# Patient Record
Sex: MALE | Race: NATIVE HAWAIIAN OR OTHER PACIFIC ISLANDER | NOT HISPANIC OR LATINO | Employment: FULL TIME | URBAN - METROPOLITAN AREA
[De-identification: names, ages, dates, MRNs, and addresses within clinical notes are randomized per-mention and may not be internally consistent; named-entity substitution may affect disease eponyms.]

---

## 2017-04-20 ENCOUNTER — NON-PROVIDER VISIT (OUTPATIENT)
Dept: URGENT CARE | Facility: PHYSICIAN GROUP | Age: 37
End: 2017-04-20

## 2017-04-20 DIAGNOSIS — Z02.1 PRE-EMPLOYMENT DRUG SCREENING: ICD-10-CM

## 2017-04-20 LAB
AMP AMPHETAMINE: NORMAL
COC COCAINE: NORMAL
INT CON NEG: NEGATIVE
INT CON POS: POSITIVE
MET METHAMPHETAMINES: NORMAL
OPI OPIATES: NORMAL
PCP PHENCYCLIDINE: NORMAL
POC DRUG COMMENT 753798-OCCUPATIONAL HEALTH: NORMAL
THC: NORMAL

## 2017-04-20 PROCEDURE — 80305 DRUG TEST PRSMV DIR OPT OBS: CPT | Performed by: EMERGENCY MEDICINE

## 2017-04-20 NOTE — PROGRESS NOTES
Kannan Farnsworth is a 37 y.o. male here for a non-provider visit for UDS    If abnormal was an in office provider notified today (if so, indicate provider)? No  Routed to PCP? No

## 2019-03-20 ENCOUNTER — APPOINTMENT (OUTPATIENT)
Dept: RADIOLOGY | Facility: MEDICAL CENTER | Age: 39
End: 2019-03-20
Attending: ORTHOPAEDIC SURGERY
Payer: COMMERCIAL

## 2019-04-08 ENCOUNTER — ANESTHESIA EVENT (OUTPATIENT)
Dept: RADIOLOGY | Facility: MEDICAL CENTER | Age: 39
End: 2019-04-08
Payer: COMMERCIAL

## 2019-04-08 ENCOUNTER — ANESTHESIA (OUTPATIENT)
Dept: RADIOLOGY | Facility: MEDICAL CENTER | Age: 39
End: 2019-04-08
Payer: COMMERCIAL

## 2019-04-08 ENCOUNTER — HOSPITAL ENCOUNTER (OUTPATIENT)
Dept: RADIOLOGY | Facility: MEDICAL CENTER | Age: 39
End: 2019-04-08
Attending: ORTHOPAEDIC SURGERY
Payer: COMMERCIAL

## 2019-04-08 VITALS
TEMPERATURE: 98.3 F | HEIGHT: 68 IN | RESPIRATION RATE: 18 BRPM | HEART RATE: 90 BPM | OXYGEN SATURATION: 97 % | BODY MASS INDEX: 25.49 KG/M2 | WEIGHT: 168.21 LBS

## 2019-04-08 DIAGNOSIS — M25.511 RIGHT SHOULDER PAIN, UNSPECIFIED CHRONICITY: ICD-10-CM

## 2019-04-08 DIAGNOSIS — Z78.9 ELECTRONIC CIGARETTE USE: ICD-10-CM

## 2019-04-08 DIAGNOSIS — M25.531 RIGHT WRIST PAIN: ICD-10-CM

## 2019-04-08 PROCEDURE — 700105 HCHG RX REV CODE 258: Performed by: ANESTHESIOLOGY

## 2019-04-08 PROCEDURE — 700111 HCHG RX REV CODE 636 W/ 250 OVERRIDE (IP): Performed by: ANESTHESIOLOGY

## 2019-04-08 PROCEDURE — 700111 HCHG RX REV CODE 636 W/ 250 OVERRIDE (IP)

## 2019-04-08 PROCEDURE — 99153 MOD SED SAME PHYS/QHP EA: CPT

## 2019-04-08 PROCEDURE — 700101 HCHG RX REV CODE 250: Performed by: ANESTHESIOLOGY

## 2019-04-08 PROCEDURE — 73221 MRI JOINT UPR EXTREM W/O DYE: CPT | Mod: RT

## 2019-04-08 PROCEDURE — 700101 HCHG RX REV CODE 250

## 2019-04-08 RX ORDER — MIDAZOLAM HYDROCHLORIDE 5 MG/ML
INJECTION INTRAMUSCULAR; INTRAVENOUS
Status: DISPENSED
Start: 2019-04-08 | End: 2019-04-08

## 2019-04-08 RX ORDER — SODIUM CHLORIDE, SODIUM LACTATE, POTASSIUM CHLORIDE, CALCIUM CHLORIDE 600; 310; 30; 20 MG/100ML; MG/100ML; MG/100ML; MG/100ML
INJECTION, SOLUTION INTRAVENOUS
Status: DISCONTINUED | OUTPATIENT
Start: 2019-04-08 | End: 2019-04-08 | Stop reason: SURG

## 2019-04-08 RX ORDER — DEXMEDETOMIDINE HYDROCHLORIDE 100 UG/ML
INJECTION, SOLUTION INTRAVENOUS PRN
Status: DISCONTINUED | OUTPATIENT
Start: 2019-04-08 | End: 2019-04-08 | Stop reason: SURG

## 2019-04-08 RX ORDER — ONDANSETRON 2 MG/ML
4 INJECTION INTRAMUSCULAR; INTRAVENOUS
Status: DISCONTINUED | OUTPATIENT
Start: 2019-04-08 | End: 2019-04-09 | Stop reason: HOSPADM

## 2019-04-08 RX ORDER — DIPHENHYDRAMINE HYDROCHLORIDE 50 MG/ML
INJECTION INTRAMUSCULAR; INTRAVENOUS
Status: DISPENSED
Start: 2019-04-08 | End: 2019-04-08

## 2019-04-08 RX ORDER — SODIUM CHLORIDE, SODIUM LACTATE, POTASSIUM CHLORIDE, AND CALCIUM CHLORIDE .6; .31; .03; .02 G/100ML; G/100ML; G/100ML; G/100ML
500 INJECTION, SOLUTION INTRAVENOUS ONCE
Status: DISCONTINUED | OUTPATIENT
Start: 2019-04-08 | End: 2019-04-09 | Stop reason: HOSPADM

## 2019-04-08 RX ORDER — DEXMEDETOMIDINE HYDROCHLORIDE 100 UG/ML
INJECTION, SOLUTION INTRAVENOUS
Status: COMPLETED
Start: 2019-04-08 | End: 2019-04-08

## 2019-04-08 RX ADMIN — SODIUM CHLORIDE, POTASSIUM CHLORIDE, SODIUM LACTATE AND CALCIUM CHLORIDE: 600; 310; 30; 20 INJECTION, SOLUTION INTRAVENOUS at 07:30

## 2019-04-08 RX ADMIN — GLYCOPYRROLATE 0.2 MG: 0.2 INJECTION INTRAMUSCULAR; INTRAVENOUS at 07:35

## 2019-04-08 RX ADMIN — MIDAZOLAM 5 MG: 1 INJECTION INTRAMUSCULAR; INTRAVENOUS at 07:35

## 2019-04-08 RX ADMIN — DEXMEDETOMIDINE HYDROCHLORIDE 20 MCG: 100 INJECTION, SOLUTION INTRAVENOUS at 07:35

## 2019-04-08 ASSESSMENT — PAIN SCALES - GENERAL: PAIN_LEVEL: 1

## 2019-04-08 NOTE — ANESTHESIA PREPROCEDURE EVALUATION
40yo M w/ shoulder and wrist pain.    No Known Allergies     Relevant Problems   Within Normal Limits   CARDIAC   ENDO   GI      NEURO   PULMONARY      Pertinent Negatives   (-) History of anesthesia complications   (-) Sleep apnea     History reviewed. No pertinent past medical history.     Current Outpatient Prescriptions on File Prior to Encounter   Medication Sig Dispense Refill   • naproxen (NAPROSYN) 500 MG TABS Take 1 Tab by mouth 2 times a day, with meals. 15 Each 2   • naproxen (NAPROSYN) 500 MG TABS Take 1 Tab by mouth every 12 hours. 20 Each 0   • ibuprofen (MOTRIN) 600 MG TABS Take 1 Tab by mouth 3 times a day, with meals. As needed for pain 20 Each 0     No current facility-administered medications on file prior to encounter.       History reviewed. No pertinent surgical history.     Physical Exam    Airway   Mallampati: II  TM distance: <3 FB  Neck ROM: full       Cardiovascular   Rhythm: regular  Rate: normal  (-) murmur     Dental - normal exam         Pulmonary   Breath sounds clear to auscultation     Abdominal    Neurological - normal exam               Anesthesia Plan    ASA 2       Plan - MAC             Induction: intravenous      Pertinent diagnostic labs and testing reviewed    Informed Consent:    Anesthetic plan and risks discussed with patient.

## 2019-04-08 NOTE — ANESTHESIA POSTPROCEDURE EVALUATION
Patient: Kannan Farnsworth    Procedure Summary     Date:  04/08/19 Room / Location:  [facility] [address]    Anesthesia Start:  0730 Anesthesia Stop:  0849    Procedure:  MR-SHOULDER-W/O Diagnosis:  Right shoulder pain, unspecified chronicity    Scheduled Providers:   Responsible Provider:  Livier Haskins M.D.    Anesthesia Type:  MAC ASA Status:  2          Final Anesthesia Type: MAC  Last vitals  BP     103/68   Temp     98.3F   Pulse    85   Resp     16   SpO2     98     Anesthesia Post Evaluation    Patient location during evaluation: bedside  Patient participation: complete - patient participated  Level of consciousness: awake and alert  Pain score: 1    Airway patency: patent  Anesthetic complications: no  Cardiovascular status: adequate and hemodynamically stable  Respiratory status: acceptable, room air and nonlabored ventilation  Hydration status: euvolemic    PONV: none

## 2019-04-08 NOTE — ANESTHESIA TIME REPORT
Anesthesia Start and Stop Event Times     Date Time Event    4/8/2019 0730 Anesthesia Start     0849 Anesthesia Stop        Responsible Staff  04/08/19    Name Role Begin End    Livier Haskins M.D. Anesth 0730 0849        Preop Diagnosis (Free Text):  Pre-op Diagnosis             Preop Diagnosis (Codes):    Post op Diagnosis  Shoulder pain      Premium Reason  Non-Premium    Comments:

## 2019-04-08 NOTE — DISCHARGE INSTRUCTIONS
MRI ADULT DISCHARGE INSTRUCTIONS    You have been medicated today for your scan. Please follow the instructions below to ensure your safe recovery. If you have any questions or problems, feel free to call us at [phone] or [phone].     1.   Have someone stay with you to assist you as needed.    2.   Do not drive or operate any mechanical devices.    3.   Do not perform any activity that requires concentration. Make no major decisions over the next 24 hours.     4.   Be careful changing positions from laying down to sitting or standing, as you may become dizzy.     5.   Do not drink alcohol for 48 hours.    6.   There are no restrictions for eating your normal meals. Drink fluids.    7.   You may continue your usual medications for pain, tranquilizers, muscle relaxants or sedatives when awake.     8.   Tomorrow, you may continue your normal daily activities.     9.   Pressure dressing on 10 - 15 minutes. If swelling or bleeding occurs when removed, continue placing direct pressure on injection site for another 5 minutes, or until bleeding stops.     Midazolam (VERSED)  What is this medicine?  You were given MIDAZOLAM (ALEXUS lynn) for your procedure today. This medication is a benzodiazepine. It is used to cause relaxation or sleep before surgery and to block the memory of the procedure.  This medicine may be used for other purposes; ask your health care provider or pharmacist if you have questions.  What side effects may I notice from receiving this medicine?  Side effects that you should report to your doctor or health care professional as soon as possible:  • allergic reactions like skin rash, itching or hives, swelling of the face, lips, or tongue  • breathing problems  • confusion  • dizziness or lightheadedness  • fast, irregular heartbeat  • halluninations during recovery  • numbness or tingling in the hands or feet  • pain, redness, or swelling at site where injected  • seizures  Side effects that usually  do not require medical attention (report to your doctor or health care professional if they continue or are bothersome):  • coughing  • headache  • hiccups  • involuntary eye and muscle movements  • loss of memory of events just before, during, and after use  • nausea, vomiting  • speech problems  • tiredness  • trouble sleeping or nightmares  This list may not describe all possible side effects. Call your doctor for medical advice about side effects. You may report side effects to FDA at 5-960-MVK-6329.          I have been informed of and understand the above discharge instructions.

## 2019-08-26 ENCOUNTER — APPOINTMENT (OUTPATIENT)
Dept: PHYSICAL THERAPY | Facility: REHABILITATION | Age: 39
End: 2019-08-26
Payer: COMMERCIAL

## 2019-08-30 ENCOUNTER — APPOINTMENT (OUTPATIENT)
Dept: PHYSICAL THERAPY | Facility: REHABILITATION | Age: 39
End: 2019-08-30
Payer: COMMERCIAL

## 2019-09-06 ENCOUNTER — APPOINTMENT (OUTPATIENT)
Dept: PHYSICAL THERAPY | Facility: REHABILITATION | Age: 39
End: 2019-09-06
Payer: COMMERCIAL

## 2019-09-09 ENCOUNTER — APPOINTMENT (OUTPATIENT)
Dept: PHYSICAL THERAPY | Facility: REHABILITATION | Age: 39
End: 2019-09-09
Payer: COMMERCIAL

## 2019-09-13 ENCOUNTER — APPOINTMENT (OUTPATIENT)
Dept: PHYSICAL THERAPY | Facility: REHABILITATION | Age: 39
End: 2019-09-13
Payer: COMMERCIAL

## 2019-09-16 ENCOUNTER — APPOINTMENT (OUTPATIENT)
Dept: PHYSICAL THERAPY | Facility: REHABILITATION | Age: 39
End: 2019-09-16
Payer: COMMERCIAL

## 2019-09-20 ENCOUNTER — APPOINTMENT (OUTPATIENT)
Dept: PHYSICAL THERAPY | Facility: REHABILITATION | Age: 39
End: 2019-09-20
Payer: COMMERCIAL

## 2021-04-19 ENCOUNTER — APPOINTMENT (OUTPATIENT)
Dept: RADIOLOGY | Facility: MEDICAL CENTER | Age: 41
End: 2021-04-19
Attending: PHYSICAL MEDICINE & REHABILITATION
Payer: COMMERCIAL

## 2021-04-27 ENCOUNTER — IMMUNIZATION (OUTPATIENT)
Dept: FAMILY PLANNING/WOMEN'S HEALTH CLINIC | Facility: IMMUNIZATION CENTER | Age: 41
End: 2021-04-27
Payer: COMMERCIAL

## 2021-04-27 DIAGNOSIS — Z23 ENCOUNTER FOR VACCINATION: Primary | ICD-10-CM

## 2021-04-27 PROCEDURE — 0001A PFIZER SARS-COV-2 VACCINE: CPT

## 2021-04-27 PROCEDURE — 91300 PFIZER SARS-COV-2 VACCINE: CPT

## 2021-05-04 ENCOUNTER — HOSPITAL ENCOUNTER (OUTPATIENT)
Dept: RADIOLOGY | Facility: MEDICAL CENTER | Age: 41
End: 2021-05-04
Attending: PHYSICAL MEDICINE & REHABILITATION
Payer: COMMERCIAL

## 2021-05-04 DIAGNOSIS — M54.2 CERVICALGIA: ICD-10-CM

## 2021-05-04 PROCEDURE — 700102 HCHG RX REV CODE 250 W/ 637 OVERRIDE(OP): Performed by: RADIOLOGY

## 2021-05-04 PROCEDURE — A9270 NON-COVERED ITEM OR SERVICE: HCPCS | Performed by: RADIOLOGY

## 2021-05-04 RX ORDER — DIAZEPAM 5 MG/1
10 TABLET ORAL
Status: COMPLETED | OUTPATIENT
Start: 2021-05-04 | End: 2021-05-04

## 2021-05-04 RX ADMIN — DIAZEPAM 10 MG: 5 TABLET ORAL at 14:02

## 2021-05-04 NOTE — DISCHARGE INSTRUCTIONS
MRI ADULT DISCHARGE INSTRUCTIONS    You have been medicated today for your scan. Please follow the instructions below to ensure your safe recovery. If you have any questions or problems, feel free to call us at [phone] or [phone].     1.   Have someone stay with you to assist you as needed.    2.   Do not drive or operate any mechanical devices.    3.   Do not perform any activity that requires concentration. Make no major decisions over the next 24 hours.     4.   Be careful changing positions from laying down to sitting or standing, as you may become dizzy.     5.   Do not drink alcohol for 48 hours.    6.   There are no restrictions for eating your normal meals. Drink fluids.    7.   You may continue your usual medications for pain, tranquilizers, muscle relaxants or sedatives when awake.     8.   Tomorrow, you may continue your normal daily activities.     9.   Pressure dressing on 10 - 15 minutes. If swelling or bleeding occurs when removed, continue placing direct pressure on injection site for another 5 minutes, or until bleeding stops.   Diazepam (VALIUM) Oral solution  What is this medicine?  You were prescribed DIAZEPAM (dye [place] e juan pablo) for the procedure you had today. This medication is a benzodiazepine. It is used to treat anxiety and nervousness. It also can help treat alcohol withdrawal, relax muscles, and treat certain types of seizures.  This medicine may be used for other purposes; ask your health care provider or pharmacist if you have questions.  What side effects may I notice from receiving this medicine?  Side effects that you should report to your doctor or health care professional as soon as possible:  • allergic reactions like skin rash, itching or hives, swelling of the face, lips, or tongue  • angry, confused, depressed, other mood changes  • breathing problems  • feeling faint or lightheaded, falls  • muscle cramps  • problems with balance, talking,  walking  • restlessness  • tremors  • trouble passing urine or change in the amount of urine  • unusually weak or tired  Side effects that usually do not require medical attention (report to your doctor or health care professional if they continue or are bothersome):  • difficulty sleeping, nightmares  • dizziness, drowsiness, clumsiness, or unsteadiness, a hangover effect  • headache  • nausea, vomiting  This list may not describe all possible side effects. Call your doctor for medical advice about side effects. You may report side effects to FDA at 5-525-SCZ-2762.    I have been informed of and understand the above discharge instructions.

## 2021-05-04 NOTE — PROGRESS NOTES
Pt unable to complete scan with oral sedation. Pt instructed to have ordering MD submit order with anesthesia

## 2021-06-24 ENCOUNTER — HOSPITAL ENCOUNTER (OUTPATIENT)
Dept: HOSPITAL 8 - RAD | Age: 41
Discharge: HOME | End: 2021-06-24
Attending: PHYSICAL MEDICINE & REHABILITATION
Payer: COMMERCIAL

## 2021-06-24 DIAGNOSIS — I10: ICD-10-CM

## 2021-06-24 DIAGNOSIS — Z87.891: ICD-10-CM

## 2021-06-24 DIAGNOSIS — Z79.1: ICD-10-CM

## 2021-06-24 DIAGNOSIS — M48.02: ICD-10-CM

## 2021-06-24 DIAGNOSIS — M54.2: Primary | ICD-10-CM

## 2021-06-24 PROCEDURE — 99157 MOD SED OTHER PHYS/QHP EA: CPT

## 2021-06-24 PROCEDURE — 72141 MRI NECK SPINE W/O DYE: CPT

## 2021-06-24 PROCEDURE — 99156 MOD SED OTH PHYS/QHP 5/>YRS: CPT

## 2022-09-26 ENCOUNTER — APPOINTMENT (OUTPATIENT)
Dept: RADIOLOGY | Facility: MEDICAL CENTER | Age: 42
End: 2022-09-26
Attending: ORTHOPAEDIC SURGERY
Payer: COMMERCIAL

## 2023-01-06 ENCOUNTER — OFFICE VISIT (OUTPATIENT)
Dept: MEDICAL GROUP | Facility: MEDICAL CENTER | Age: 43
End: 2023-01-06
Attending: NURSE PRACTITIONER
Payer: COMMERCIAL

## 2023-01-06 VITALS
RESPIRATION RATE: 17 BRPM | TEMPERATURE: 98.1 F | HEIGHT: 68 IN | WEIGHT: 166.5 LBS | DIASTOLIC BLOOD PRESSURE: 76 MMHG | HEART RATE: 91 BPM | SYSTOLIC BLOOD PRESSURE: 110 MMHG | BODY MASS INDEX: 25.23 KG/M2 | OXYGEN SATURATION: 100 %

## 2023-01-06 DIAGNOSIS — Z13.29 SCREENING FOR ENDOCRINE, NUTRITIONAL, METABOLIC AND IMMUNITY DISORDER: ICD-10-CM

## 2023-01-06 DIAGNOSIS — Z11.59 NEED FOR HEPATITIS C SCREENING TEST: ICD-10-CM

## 2023-01-06 DIAGNOSIS — G47.30 SLEEP APNEA, UNSPECIFIED TYPE: ICD-10-CM

## 2023-01-06 DIAGNOSIS — Z13.228 SCREENING FOR ENDOCRINE, NUTRITIONAL, METABOLIC AND IMMUNITY DISORDER: ICD-10-CM

## 2023-01-06 DIAGNOSIS — Z13.21 SCREENING FOR ENDOCRINE, NUTRITIONAL, METABOLIC AND IMMUNITY DISORDER: ICD-10-CM

## 2023-01-06 DIAGNOSIS — Z76.89 ENCOUNTER TO ESTABLISH CARE: ICD-10-CM

## 2023-01-06 DIAGNOSIS — R73.09 ELEVATED GLUCOSE: ICD-10-CM

## 2023-01-06 DIAGNOSIS — R74.8 ELEVATED LIVER ENZYMES: ICD-10-CM

## 2023-01-06 DIAGNOSIS — Z13.0 SCREENING FOR ENDOCRINE, NUTRITIONAL, METABOLIC AND IMMUNITY DISORDER: ICD-10-CM

## 2023-01-06 PROCEDURE — 99204 OFFICE O/P NEW MOD 45 MIN: CPT | Performed by: NURSE PRACTITIONER

## 2023-01-06 PROCEDURE — 99213 OFFICE O/P EST LOW 20 MIN: CPT | Performed by: NURSE PRACTITIONER

## 2023-01-06 ASSESSMENT — PATIENT HEALTH QUESTIONNAIRE - PHQ9: CLINICAL INTERPRETATION OF PHQ2 SCORE: 0

## 2023-01-06 NOTE — ASSESSMENT & PLAN NOTE
Discussed health history and maintenance   Flu vaccine - Declined   Colon Ca screening - Not applicable   Preventative screening labs ordered-patient will follow up with me in 1 month for any abnormalities.

## 2023-01-06 NOTE — ASSESSMENT & PLAN NOTE
Ongoing-  Referral to sleep medicine placed  Encourage patient to continue to use CPAP until he can get in with new provider.

## 2023-01-06 NOTE — PROGRESS NOTES
"Chief Complaint   Patient presents with    Establish Care    Referral Needed     Sleep apnea       Subjective:     HPI:   Kannan Farnsworth is a 42 y.o. male here to discuss the evaluation and management of:        Problem   Encounter to Establish Care    Patient here to establish care.  Was previously being seen by providers with [facility].  Patient states that he has not been able to get into see his sleep medicine doctor as they are unable to insurance and that he feels his CPAP is not working correctly.  Patient is also due for lab work.      Sleep Apnea    Patient states he feels like his CPAP machine is not working.  Patient is waking up with headaches, fatigue, sore throat, and trouble breathing.  Patient states that he was trying to get back in with his previous sleep medicine physician however they are no longer taking his insurance and he needs to find a new one.         ROS  See HPI       No Known Allergies    Current medicines (including changes today)  No current outpatient medications on file.     No current facility-administered medications for this visit.       Social History     Tobacco Use    Smoking status: Former    Smokeless tobacco: Never   Vaping Use    Vaping Use: Every day    Substances: Nicotine    Devices: Pre-filled or refillable cartridge   Substance Use Topics    Alcohol use: Not Currently     Comment: ocass    Drug use: Never       Patient Active Problem List    Diagnosis Date Noted    Encounter to establish care 01/06/2023    Sleep apnea 01/06/2023    Electronic cigarette use 04/08/2019       No family history on file.       Objective:     /76 (BP Location: Left arm, Patient Position: Sitting, BP Cuff Size: Adult)   Pulse 91   Temp 36.7 °C (98.1 °F) (Temporal)   Resp 17   Ht 1.727 m (5' 8\")   Wt 75.5 kg (166 lb 8 oz)   SpO2 100%  Body mass index is 25.32 kg/m².    Physical Exam:  Physical Exam  Vitals reviewed.   Constitutional:       General: He is awake.      " Appearance: Normal appearance. He is well-developed.   HENT:      Head: Normocephalic.   Eyes:      Conjunctiva/sclera: Conjunctivae normal.   Cardiovascular:      Rate and Rhythm: Normal rate and regular rhythm.      Heart sounds: Normal heart sounds.   Pulmonary:      Effort: Pulmonary effort is normal. No respiratory distress.      Breath sounds: Normal breath sounds. No wheezing.   Musculoskeletal:      Cervical back: Neck supple.   Skin:     General: Skin is warm and dry.   Neurological:      Mental Status: He is alert and oriented to person, place, and time.   Psychiatric:         Mood and Affect: Mood normal.         Behavior: Behavior normal. Behavior is cooperative.       Assessment and Plan:     The following treatment plan was discussed:    Problem List Items Addressed This Visit       Encounter to establish care     Discussed health history and maintenance   Flu vaccine - Declined   Colon Ca screening - Not applicable   Preventative screening labs ordered-patient will follow up with me in 1 month for any abnormalities.           Sleep apnea     Ongoing-  Referral to sleep medicine placed  Encourage patient to continue to use CPAP until he can get in with new provider.         Relevant Orders    Referral to Pulmonary and Sleep Medicine     Other Visit Diagnoses       Elevated glucose        Relevant Orders    HEMOGLOBIN A1C    Elevated liver enzymes        Relevant Orders    Lipid Profile    Comp Metabolic Panel    TSH    Screening for endocrine, nutritional, metabolic and immunity disorder        Relevant Orders    TSH    VITAMIN D,25 HYDROXY (DEFICIENCY)    FREE THYROXINE    Need for hepatitis C screening test        Relevant Orders    HEP C VIRUS ANTIBODY            Any change or worsening of signs or symptoms, patient encouraged to follow-up or report to emergency room for further evaluation. Patient verbalizes understanding and agrees.    Follow-Up: Return in about 1 month (around 2/6/2023) for  Follow up Labs.      PLEASE NOTE: This dictation was created using voice recognition software. I have made every reasonable attempt to correct obvious errors, but I expect that there are errors of grammar and possibly content that I did not discover before finalizing the note.

## 2023-04-25 ENCOUNTER — OFFICE VISIT (OUTPATIENT)
Dept: MEDICAL GROUP | Facility: MEDICAL CENTER | Age: 43
End: 2023-04-25
Attending: NURSE PRACTITIONER
Payer: COMMERCIAL

## 2023-04-25 VITALS
TEMPERATURE: 97.8 F | HEIGHT: 68 IN | BODY MASS INDEX: 26.64 KG/M2 | DIASTOLIC BLOOD PRESSURE: 78 MMHG | HEART RATE: 110 BPM | SYSTOLIC BLOOD PRESSURE: 126 MMHG | WEIGHT: 175.8 LBS | RESPIRATION RATE: 20 BRPM | OXYGEN SATURATION: 98 %

## 2023-04-25 DIAGNOSIS — Z13.228 SCREENING FOR ENDOCRINE, NUTRITIONAL, METABOLIC AND IMMUNITY DISORDER: ICD-10-CM

## 2023-04-25 DIAGNOSIS — Z13.0 SCREENING FOR ENDOCRINE, NUTRITIONAL, METABOLIC AND IMMUNITY DISORDER: ICD-10-CM

## 2023-04-25 DIAGNOSIS — E55.9 VITAMIN D DEFICIENCY: ICD-10-CM

## 2023-04-25 DIAGNOSIS — Z13.29 SCREENING FOR ENDOCRINE, NUTRITIONAL, METABOLIC AND IMMUNITY DISORDER: ICD-10-CM

## 2023-04-25 DIAGNOSIS — R74.8 ELEVATED LIVER ENZYMES: ICD-10-CM

## 2023-04-25 DIAGNOSIS — Z13.21 SCREENING FOR ENDOCRINE, NUTRITIONAL, METABOLIC AND IMMUNITY DISORDER: ICD-10-CM

## 2023-04-25 PROCEDURE — 99212 OFFICE O/P EST SF 10 MIN: CPT | Performed by: NURSE PRACTITIONER

## 2023-04-25 PROCEDURE — 99214 OFFICE O/P EST MOD 30 MIN: CPT | Performed by: NURSE PRACTITIONER

## 2023-04-26 PROBLEM — R74.8 ELEVATED LIVER ENZYMES: Status: ACTIVE | Noted: 2023-04-26

## 2023-04-26 PROBLEM — E55.9 VITAMIN D DEFICIENCY: Status: ACTIVE | Noted: 2023-04-26

## 2023-04-26 RX ORDER — ERGOCALCIFEROL 1.25 MG/1
50000 CAPSULE ORAL
Qty: 12 CAPSULE | Refills: 0 | Status: SHIPPED | OUTPATIENT
Start: 2023-04-26

## 2023-04-26 NOTE — ASSESSMENT & PLAN NOTE
Ongoing-   Had repeat labs completed-  AST- 38  ALT- 96 - Remains elevated but significantly improved  Cholesterol also noted to be elevated   Triglycerides- 250  LDL- 110   HDL-33  Will avoid alcohol and tylenol use for now- Plans to become more active now that it is getting warmer outside-   Discussed dietary changes that may be beneficial.

## 2023-04-26 NOTE — PROGRESS NOTES
"No chief complaint on file.      Subjective:     HPI:   Kannan Farnsworth is a 43 y.o. male here to discuss the evaluation and management of:      Problem   Elevated Liver Enzymes    Patient had previous elevated liver enzymes back in 2019 :   Latest Reference Range & Units 04/12/19 09:19   AST(SGOT) 15 - 37 U/L 84 (H) (E)   ALT(SGPT) 12 - 78 U/L 212 (H) (E)   (H): Data is abnormally high  (E): External lab result  Had patient go follow up with new lab work to re-evaluate.      Vitamin D Deficiency    Patient had a history of vitamin D deficiency and has been taking over-the-counter supplementation.  Patient was interested in knowing what his vitamin D levels were.         ROS  See HPI     No Known Allergies    Current medicines (including changes today)  No current outpatient medications on file.     No current facility-administered medications for this visit.       Social History     Tobacco Use    Smoking status: Former    Smokeless tobacco: Never   Vaping Use    Vaping Use: Every day    Substances: Nicotine    Devices: Pre-filled or refillable cartridge   Substance Use Topics    Alcohol use: Not Currently     Comment: ocass    Drug use: Never       Patient Active Problem List    Diagnosis Date Noted    Elevated liver enzymes 04/26/2023    Vitamin D deficiency 04/26/2023    Encounter to establish care 01/06/2023    Sleep apnea 01/06/2023    Electronic cigarette use 04/08/2019       History reviewed. No pertinent family history.       Objective:     /78 (BP Location: Left arm, Patient Position: Sitting, BP Cuff Size: Adult)   Pulse (!) 110   Temp 36.6 °C (97.8 °F) (Temporal)   Resp 20   Ht 1.727 m (5' 8\")   Wt 79.7 kg (175 lb 12.8 oz)   SpO2 98%  Body mass index is 26.73 kg/m².    Physical Exam:  Physical Exam  Vitals reviewed.   Constitutional:       General: He is awake.      Appearance: Normal appearance. He is well-developed.   HENT:      Head: Normocephalic.   Eyes:      Conjunctiva/sclera: " Conjunctivae normal.   Cardiovascular:      Rate and Rhythm: Normal rate.   Pulmonary:      Effort: Pulmonary effort is normal. No respiratory distress.   Musculoskeletal:      Cervical back: Neck supple.   Skin:     General: Skin is warm and dry.   Neurological:      Mental Status: He is alert and oriented to person, place, and time.   Psychiatric:         Mood and Affect: Mood normal.         Behavior: Behavior normal. Behavior is cooperative.            Assessment and Plan:     The following treatment plan was discussed:    Problem List Items Addressed This Visit       Elevated liver enzymes     Ongoing-   Had repeat labs completed-  AST- 38  ALT- 96 - Remains elevated but significantly improved  Cholesterol also noted to be elevated   Triglycerides- 250  LDL- 110   HDL-33  Will avoid alcohol and tylenol use for now- Plans to become more active now that it is getting warmer outside-   Discussed dietary changes that may be beneficial.          Vitamin D deficiency     Ongoing-  Despite over-the-counter supplementation vitamin D levels remain low at 23  We will have patient go on high-dose therapy for 3 months and then return to over-the-counter supplementation  Ergocalciferol 50,000 units p.o. once weekly for the next 12 weeks sent to pharmacy         Relevant Medications    ergocalciferol (DRISDOL) 87540 UNIT capsule     Other Visit Diagnoses       Screening for endocrine, nutritional, metabolic and immunity disorder                Any change or worsening of signs or symptoms, patient encouraged to follow-up or report to emergency room for further evaluation. Patient verbalizes understanding and agrees.    Follow-Up: Return in about 1 year (around 4/25/2024).      PLEASE NOTE: This dictation was created using voice recognition software. I have made every reasonable attempt to correct obvious errors, but I expect that there are errors of grammar and possibly content that I did not discover before finalizing the  note.

## 2023-04-26 NOTE — ASSESSMENT & PLAN NOTE
Ongoing-  Despite over-the-counter supplementation vitamin D levels remain low at 23  We will have patient go on high-dose therapy for 3 months and then return to over-the-counter supplementation  Ergocalciferol 50,000 units p.o. once weekly for the next 12 weeks sent to pharmacy

## 2024-01-10 ENCOUNTER — APPOINTMENT (OUTPATIENT)
Dept: RADIOLOGY | Facility: MEDICAL CENTER | Age: 44
End: 2024-01-10
Attending: ORTHOPAEDIC SURGERY
Payer: MEDICAID

## 2024-01-10 ENCOUNTER — ANESTHESIA EVENT (OUTPATIENT)
Dept: RADIOLOGY | Facility: MEDICAL CENTER | Age: 44
End: 2024-01-10
Payer: MEDICAID

## 2024-01-10 ENCOUNTER — ANESTHESIA (OUTPATIENT)
Dept: RADIOLOGY | Facility: MEDICAL CENTER | Age: 44
End: 2024-01-10
Payer: MEDICAID

## 2024-01-10 VITALS
DIASTOLIC BLOOD PRESSURE: 74 MMHG | SYSTOLIC BLOOD PRESSURE: 114 MMHG | BODY MASS INDEX: 25.49 KG/M2 | HEIGHT: 68 IN | WEIGHT: 168.21 LBS | OXYGEN SATURATION: 95 % | TEMPERATURE: 98.3 F | HEART RATE: 86 BPM | RESPIRATION RATE: 20 BRPM

## 2024-01-10 DIAGNOSIS — M25.211: ICD-10-CM

## 2024-01-10 PROCEDURE — 700111 HCHG RX REV CODE 636 W/ 250 OVERRIDE (IP): Mod: UD | Performed by: ANESTHESIOLOGY

## 2024-01-10 PROCEDURE — 700101 HCHG RX REV CODE 250: Mod: UD | Performed by: ANESTHESIOLOGY

## 2024-01-10 PROCEDURE — 700105 HCHG RX REV CODE 258: Mod: UD | Performed by: ANESTHESIOLOGY

## 2024-01-10 PROCEDURE — 4410588 MR-ELBOW-W/O

## 2024-01-10 RX ORDER — LIDOCAINE HYDROCHLORIDE 20 MG/ML
INJECTION, SOLUTION EPIDURAL; INFILTRATION; INTRACAUDAL; PERINEURAL PRN
Status: DISCONTINUED | OUTPATIENT
Start: 2024-01-10 | End: 2024-01-10 | Stop reason: SURG

## 2024-01-10 RX ORDER — SODIUM CHLORIDE, SODIUM LACTATE, POTASSIUM CHLORIDE, CALCIUM CHLORIDE 600; 310; 30; 20 MG/100ML; MG/100ML; MG/100ML; MG/100ML
INJECTION, SOLUTION INTRAVENOUS
Status: DISCONTINUED | OUTPATIENT
Start: 2024-01-10 | End: 2024-01-10 | Stop reason: SURG

## 2024-01-10 RX ORDER — NAPROXEN 500 MG/1
500 TABLET ORAL 2 TIMES DAILY PRN
COMMUNITY

## 2024-01-10 RX ADMIN — PROPOFOL 150 MG: 10 INJECTION, EMULSION INTRAVENOUS at 12:46

## 2024-01-10 RX ADMIN — LIDOCAINE HYDROCHLORIDE 80 MG: 20 INJECTION, SOLUTION EPIDURAL; INFILTRATION; INTRACAUDAL at 12:46

## 2024-01-10 RX ADMIN — SODIUM CHLORIDE, POTASSIUM CHLORIDE, SODIUM LACTATE AND CALCIUM CHLORIDE: 600; 310; 30; 20 INJECTION, SOLUTION INTRAVENOUS at 12:40

## 2024-01-10 ASSESSMENT — PAIN SCALES - GENERAL: PAIN_LEVEL: 5

## 2024-01-10 NOTE — DISCHARGE INSTRUCTIONS
MRI ADULT DISCHARGE INSTRUCTIONS    You have been medicated today for your scan. Please follow the instructions below to ensure your safe recovery. If you have any questions or problems, feel free to call us at [phone] or [phone].     1.   Have someone stay with you to assist you as needed.    2.   Do not drive or operate any mechanical devices.    3.   Do not perform any activity that requires concentration. Make no major decisions over the next 24 hours.     4.   Be careful changing positions from laying down to sitting or standing, as you may become dizzy.     5.   Do not drink alcohol for 48 hours.    6.   There are no restrictions for eating your normal meals. Drink fluids.    7.   You may continue your usual medications for pain, tranquilizers, muscle relaxants or sedatives when awake.     8.   Tomorrow, you may continue your normal daily activities.     9.   Pressure dressing on 10 - 15 minutes. If swelling or bleeding occurs when removed, continue placing direct pressure on injection site for another 5 minutes, or until bleeding stops.     I have been informed of and understand the above discharge instructions.

## 2024-01-10 NOTE — ANESTHESIA TIME REPORT
Anesthesia Start and Stop Event Times       Date Time Event    1/10/2024 1233 Ready for Procedure     1240 Anesthesia Start     1326 Anesthesia Stop          Responsible Staff  01/10/24      Name Role Begin End    Mabel Espinoza M.D. Anesth 1240 1326          Overtime Reason:  no overtime (within assigned shift)    Comments:                                                           Fior Rivera called and states he was in ED on 9-13-22 for abdominal pain. States he was informed in the ED that he needed to ask his PCP to order an ultrasound of the liver. Bryant Zapata is asking if PCP could order the Liver ultrasound to be completed at [address] [place] [address] per ED staff recommendations or does he need to make an appt first to see you in order to get the ultrasound ordered? Please advise. Thank you.

## 2024-01-10 NOTE — PROGRESS NOTES
Patient and wife to MRI outpatient dept. Patient informed process and plan of care during this visit.  Anesthesiologist KARINA Espinoza spoke with Patient and discussed provider's plan of care.  Consent obtained.  MRI completed without incident.  Patient tolerated diet and activities once awake, alert, and appropriate.  DC instructions discussed with Patient and .  Questions encouraged and answered.  Defer to Provider for further instruction.  Patient discharged in stable condition to responsible adult.

## 2024-01-10 NOTE — ANESTHESIA PROCEDURE NOTES
Airway    Date/Time: 1/10/2024 12:47 PM    Performed by: Mabel Espinoza M.D.  Authorized by: Mabel Espinoza M.D.    Location:  OR  Urgency:  Elective  Indications for Airway Management:  Anesthesia      Spontaneous Ventilation: absent    Sedation Level:  Deep  Preoxygenated: Yes    Mask Difficulty Assessment:  0 - not attempted  Final Airway Type:  Supraglottic airway  Final Supraglottic Airway:  Standard LMA    SGA Size:  4  Number of Attempts at Approach:  1

## 2024-01-10 NOTE — ANESTHESIA POSTPROCEDURE EVALUATION
Patient: Kannan Farnsworth    Procedure Summary       Date: 01/10/24 [address]    Anesthesia Start: 1240 Anesthesia Stop: 1326    Procedure: MR-ELBOW-W/O Diagnosis:       Flail joint of right shoulder      (ANESTHESIA PROTOCOL)    Scheduled Providers: Mabel Espinoza M.D. Responsible Provider: Mabel Espinoza M.D.    Anesthesia Type: general ASA Status: 2            Final Anesthesia Type: general  Last vitals  BP   Blood Pressure: 115/71    Temp   36.8 °C (98.3 °F)    Pulse   83   Resp   16    SpO2   97 %      Anesthesia Post Evaluation    Patient location during evaluation: PACU  Patient participation: complete - patient participated  Level of consciousness: awake and alert  Pain score: 5    Airway patency: patent  Anesthetic complications: no  Cardiovascular status: hemodynamically stable  Respiratory status: acceptable  Hydration status: euvolemic    PONV: none          No notable events documented.     Nurse Pain Score: 7 (NPRS)

## 2024-01-10 NOTE — ANESTHESIA PREPROCEDURE EVALUATION
Date/Time: 01/10/24 1230    Scheduled providers: Mabel Espinoza M.D.    Procedure: MR-ELBOW-W/O    Diagnosis: Flail joint of right shoulder [M25.211]    Indications: ANESTHESIA PROTOCOL    Location: [facility] MRI - [address]            Relevant Problems   ANESTHESIA   (positive) Sleep apnea      Other   (positive) Electronic cigarette use       Physical Exam    Airway   Mallampati: II  TM distance: >3 FB  Neck ROM: full       Cardiovascular - normal exam  Rhythm: regular  Rate: normal  (-) murmur     Dental - normal exam           Pulmonary - normal exam  Breath sounds clear to auscultation     Abdominal    Neurological - normal exam                   Anesthesia Plan    ASA 2       Plan - general       Airway plan will be LMA          Induction: intravenous    Postoperative Plan: Postoperative administration of opioids is intended.    Pertinent diagnostic labs and testing reviewed    Informed Consent:    Anesthetic plan and risks discussed with patient.    Use of blood products discussed with: patient whom consented to blood products.

## 2024-08-03 SDOH — ECONOMIC STABILITY: HOUSING INSECURITY

## 2024-08-03 SDOH — ECONOMIC STABILITY: HOUSING INSECURITY
IN THE LAST 12 MONTHS, WAS THERE A TIME WHEN YOU DID NOT HAVE A STEADY PLACE TO SLEEP OR SLEPT IN A SHELTER (INCLUDING NOW)?: NO

## 2024-08-03 SDOH — ECONOMIC STABILITY: TRANSPORTATION INSECURITY
IN THE PAST 12 MONTHS, HAS LACK OF TRANSPORTATION KEPT YOU FROM MEETINGS, WORK, OR FROM GETTING THINGS NEEDED FOR DAILY LIVING?: NO

## 2024-08-03 SDOH — ECONOMIC STABILITY: INCOME INSECURITY: IN THE LAST 12 MONTHS, WAS THERE A TIME WHEN YOU WERE NOT ABLE TO PAY THE MORTGAGE OR RENT ON TIME?: NO

## 2024-08-03 SDOH — ECONOMIC STABILITY: INCOME INSECURITY: HOW HARD IS IT FOR YOU TO PAY FOR THE VERY BASICS LIKE FOOD, HOUSING, MEDICAL CARE, AND HEATING?: SOMEWHAT HARD

## 2024-08-03 SDOH — ECONOMIC STABILITY: TRANSPORTATION INSECURITY
IN THE PAST 12 MONTHS, HAS THE LACK OF TRANSPORTATION KEPT YOU FROM MEDICAL APPOINTMENTS OR FROM GETTING MEDICATIONS?: NO

## 2024-08-03 SDOH — ECONOMIC STABILITY: FOOD INSECURITY: WITHIN THE PAST 12 MONTHS, THE FOOD YOU BOUGHT JUST DIDN'T LAST AND YOU DIDN'T HAVE MONEY TO GET MORE.: NEVER TRUE

## 2024-08-03 SDOH — ECONOMIC STABILITY: FOOD INSECURITY: WITHIN THE PAST 12 MONTHS, YOU WORRIED THAT YOUR FOOD WOULD RUN OUT BEFORE YOU GOT MONEY TO BUY MORE.: NEVER TRUE

## 2024-08-03 SDOH — HEALTH STABILITY: PHYSICAL HEALTH: ON AVERAGE, HOW MANY MINUTES DO YOU ENGAGE IN EXERCISE AT THIS LEVEL?: 0 MIN

## 2024-08-03 SDOH — HEALTH STABILITY: PHYSICAL HEALTH: ON AVERAGE, HOW MANY DAYS PER WEEK DO YOU ENGAGE IN MODERATE TO STRENUOUS EXERCISE (LIKE A BRISK WALK)?: 0 DAYS

## 2024-08-03 SDOH — ECONOMIC STABILITY: TRANSPORTATION INSECURITY
IN THE PAST 12 MONTHS, HAS LACK OF RELIABLE TRANSPORTATION KEPT YOU FROM MEDICAL APPOINTMENTS, MEETINGS, WORK OR FROM GETTING THINGS NEEDED FOR DAILY LIVING?: NO

## 2024-08-03 SDOH — HEALTH STABILITY: MENTAL HEALTH
STRESS IS WHEN SOMEONE FEELS TENSE, NERVOUS, ANXIOUS, OR CAN'T SLEEP AT NIGHT BECAUSE THEIR MIND IS TROUBLED. HOW STRESSED ARE YOU?: TO SOME EXTENT

## 2024-08-03 ASSESSMENT — LIFESTYLE VARIABLES
HOW OFTEN DO YOU HAVE SIX OR MORE DRINKS ON ONE OCCASION: NEVER
HOW MANY STANDARD DRINKS CONTAINING ALCOHOL DO YOU HAVE ON A TYPICAL DAY: 1 OR 2
SKIP TO QUESTIONS 9-10: 1
HOW OFTEN DO YOU HAVE A DRINK CONTAINING ALCOHOL: MONTHLY OR LESS
AUDIT-C TOTAL SCORE: 1

## 2024-08-03 ASSESSMENT — SOCIAL DETERMINANTS OF HEALTH (SDOH)
IN A TYPICAL WEEK, HOW MANY TIMES DO YOU TALK ON THE PHONE WITH FAMILY, FRIENDS, OR NEIGHBORS?: TWICE A WEEK
HOW HARD IS IT FOR YOU TO PAY FOR THE VERY BASICS LIKE FOOD, HOUSING, MEDICAL CARE, AND HEATING?: SOMEWHAT HARD
HOW OFTEN DO YOU HAVE A DRINK CONTAINING ALCOHOL: MONTHLY OR LESS
HOW MANY DRINKS CONTAINING ALCOHOL DO YOU HAVE ON A TYPICAL DAY WHEN YOU ARE DRINKING: 1 OR 2
HOW OFTEN DO YOU HAVE SIX OR MORE DRINKS ON ONE OCCASION: NEVER
IN A TYPICAL WEEK, HOW MANY TIMES DO YOU TALK ON THE PHONE WITH FAMILY, FRIENDS, OR NEIGHBORS?: TWICE A WEEK
WITHIN THE PAST 12 MONTHS, YOU WORRIED THAT YOUR FOOD WOULD RUN OUT BEFORE YOU GOT THE MONEY TO BUY MORE: NEVER TRUE
HOW OFTEN DO YOU ATTENT MEETINGS OF THE CLUB OR ORGANIZATION YOU BELONG TO?: NEVER
HOW OFTEN DO YOU ATTENT MEETINGS OF THE CLUB OR ORGANIZATION YOU BELONG TO?: NEVER
HOW OFTEN DO YOU GET TOGETHER WITH FRIENDS OR RELATIVES?: ONCE A WEEK
HOW OFTEN DO YOU ATTEND CHURCH OR RELIGIOUS SERVICES?: 1 TO 4 TIMES PER YEAR
DO YOU BELONG TO ANY CLUBS OR ORGANIZATIONS SUCH AS CHURCH GROUPS UNIONS, FRATERNAL OR ATHLETIC GROUPS, OR SCHOOL GROUPS?: NO
DO YOU BELONG TO ANY CLUBS OR ORGANIZATIONS SUCH AS CHURCH GROUPS UNIONS, FRATERNAL OR ATHLETIC GROUPS, OR SCHOOL GROUPS?: NO
IN THE PAST 12 MONTHS, HAS THE ELECTRIC, GAS, OIL, OR WATER COMPANY THREATENED TO SHUT OFF SERVICE IN YOUR HOME?: NO
HOW OFTEN DO YOU GET TOGETHER WITH FRIENDS OR RELATIVES?: ONCE A WEEK
HOW OFTEN DO YOU ATTEND CHURCH OR RELIGIOUS SERVICES?: 1 TO 4 TIMES PER YEAR

## 2024-08-06 ENCOUNTER — OFFICE VISIT (OUTPATIENT)
Dept: MEDICAL GROUP | Facility: MEDICAL CENTER | Age: 44
End: 2024-08-06
Attending: NURSE PRACTITIONER
Payer: MEDICAID

## 2024-08-06 VITALS
RESPIRATION RATE: 16 BRPM | BODY MASS INDEX: 27.16 KG/M2 | TEMPERATURE: 96.5 F | OXYGEN SATURATION: 96 % | DIASTOLIC BLOOD PRESSURE: 70 MMHG | HEIGHT: 69 IN | HEART RATE: 83 BPM | SYSTOLIC BLOOD PRESSURE: 110 MMHG | WEIGHT: 183.4 LBS

## 2024-08-06 DIAGNOSIS — H93.19 TINNITUS, UNSPECIFIED LATERALITY: ICD-10-CM

## 2024-08-06 DIAGNOSIS — H57.89 SWOLLEN EYE: ICD-10-CM

## 2024-08-06 DIAGNOSIS — E55.9 VITAMIN D DEFICIENCY: ICD-10-CM

## 2024-08-06 DIAGNOSIS — R73.09 ELEVATED HEMOGLOBIN A1C: ICD-10-CM

## 2024-08-06 DIAGNOSIS — Z12.11 COLON CANCER SCREENING: ICD-10-CM

## 2024-08-06 DIAGNOSIS — F32.9 REACTIVE DEPRESSION: ICD-10-CM

## 2024-08-06 DIAGNOSIS — E78.1 HIGH TRIGLYCERIDES: ICD-10-CM

## 2024-08-06 PROCEDURE — 3074F SYST BP LT 130 MM HG: CPT | Performed by: NURSE PRACTITIONER

## 2024-08-06 PROCEDURE — 99213 OFFICE O/P EST LOW 20 MIN: CPT | Performed by: NURSE PRACTITIONER

## 2024-08-06 PROCEDURE — 99214 OFFICE O/P EST MOD 30 MIN: CPT | Performed by: NURSE PRACTITIONER

## 2024-08-06 PROCEDURE — 3078F DIAST BP <80 MM HG: CPT | Performed by: NURSE PRACTITIONER

## 2024-08-06 RX ORDER — DULOXETIN HYDROCHLORIDE 30 MG/1
30 CAPSULE, DELAYED RELEASE ORAL DAILY
Qty: 30 CAPSULE | Refills: 2 | Status: SHIPPED | OUTPATIENT
Start: 2024-08-06

## 2024-08-06 RX ORDER — CARBOXYMETHYLCELLULOSE SODIUM 10 MG/ML
1 GEL OPHTHALMIC PRN
Qty: 28 EACH | Refills: 0 | Status: SHIPPED | OUTPATIENT
Start: 2024-08-06

## 2024-08-06 ASSESSMENT — PATIENT HEALTH QUESTIONNAIRE - PHQ9: CLINICAL INTERPRETATION OF PHQ2 SCORE: 0

## 2024-08-06 NOTE — PROGRESS NOTES
Verbal consent was acquired by the patient to use [employer] ambient listening note generation during this visit     Chief Complaint   Patient presents with    Follow-Up     Swollen eyes when he wakes up feel pressure most of the time.       Subjective:     HPI:   History of Present Illness  The patient presents for evaluation of multiple medical concerns. He is accompanied by his parents.    The patient reports experiencing periorbital edema, predominantly on the right side, predominantly in the morning, which resolves as the day progresses. He refrains from sleeping on his right side due to the use of a respiratory mask, which fits properly. Occasionally, he experiences air leakage when he attempts to sleep on his side. He denies any associated sinus congestion.    The patient's significant other/ ex wife who he has reconciled with, reports feelings of irritability, stress, and depression, which she attributes to his high stress levels. The patient's 14-year-old daughter has been receiving treatment for depression and stress for the past few months. The patient's previous physician, Dr. Jolley, prescribed medication following a work-related injury, which required a 4-year Worker's Compensation claim. The patient's condition has deteriorated, leading to emotional distress and financial constraints. He expresses a desire to relocate, citing financial constraints and financial burden.   He is experiencing a lot of stress at home with his daughter, significant other and financially with increases in cost of living. This has made him irritable and quick to anger.         No problems updated.    ROS  See HPI     No Known Allergies    Current medicines (including changes today)  Current Outpatient Medications   Medication Sig Dispense Refill    DULoxetine (CYMBALTA) 30 MG Cap DR Particles Take 1 Capsule by mouth every day. 30 Capsule 2    carboxymethylcellulose (REFRESH CELLUVISC) 1 % Gel Apply 1 Drop to right eye as  "needed (use drops at night). 28 Each 0    naproxen (NAPROSYN) 500 MG Tab Take 500 mg by mouth 2 times a day as needed. for pain       No current facility-administered medications for this visit.       Social History     Tobacco Use    Smoking status: Former    Smokeless tobacco: Never   Vaping Use    Vaping status: Every Day    Substances: Nicotine    Devices: Pre-filled or refillable cartridge   Substance Use Topics    Alcohol use: Not Currently     Comment: ocass    Drug use: Never       Patient Active Problem List    Diagnosis Date Noted    Elevated liver enzymes 04/26/2023    Vitamin D deficiency 04/26/2023    Encounter to establish care 01/06/2023    Sleep apnea 01/06/2023    Electronic cigarette use 04/08/2019       No family history on file.       Objective:     /70 (BP Location: Left arm, Patient Position: Sitting, BP Cuff Size: Adult)   Pulse 83   Temp 35.8 °C (96.5 °F) (Temporal)   Resp 16   Ht 1.753 m (5' 9\")   Wt 83.2 kg (183 lb 6.4 oz)   SpO2 96%  Body mass index is 27.08 kg/m².    Physical Exam:  Physical Exam  Vitals reviewed.   Constitutional:       General: He is awake.      Appearance: Normal appearance. He is well-developed.   HENT:      Head: Normocephalic.   Eyes:      Conjunctiva/sclera: Conjunctivae normal.   Cardiovascular:      Rate and Rhythm: Normal rate.   Pulmonary:      Effort: Pulmonary effort is normal. No respiratory distress.   Musculoskeletal:      Cervical back: Neck supple.   Skin:     General: Skin is warm and dry.   Neurological:      Mental Status: He is alert and oriented to person, place, and time.   Psychiatric:         Mood and Affect: Mood normal.         Behavior: Behavior normal. Behavior is cooperative.              Assessment and Plan:     The following treatment plan was discussed:    Problem List Items Addressed This Visit       Vitamin D deficiency    Relevant Orders    VITAMIN D,25 HYDROXY (DEFICIENCY)     Other Visit Diagnoses       Colon cancer " screening        Relevant Orders    Referral to GI for Colonoscopy    Elevated hemoglobin A1c        Relevant Orders    HEMOGLOBIN A1C    Comp Metabolic Panel    High triglycerides        Relevant Orders    Lipid Profile    Comp Metabolic Panel    Swollen eye        Relevant Medications    carboxymethylcellulose (REFRESH CELLUVISC) 1 % Gel    Reactive depression        Relevant Medications    DULoxetine (CYMBALTA) 30 MG Cap DR Particles    Tinnitus, unspecified laterality        Relevant Orders    Referral to Audiology            Assessment & Plan  1. Depression/anxiety/mood instability.  A low-dose duloxetine regimen will be initiated. The patient has been informed about the risks, benefits, and the need to take the medication daily for several weeks to perceive full effects. He demonstrated comprehension of this plan. The family therapy was discussed as a potential beneficial option, however, he declined this option at this time.    2. Tinnitus.  An audiology referral will be made to conduct a hearing test to screen for potential hearing loss.    3. Swollen right eye.  The patient has been advised to change his pillowcase and apply rewetting ointment prior to bed, as it may be causing dryness due to his CPAP dislocation.    4. Annual exam.  Annual labs, which were due in 06/2023, have been ordered for the patient to complete and schedule a follow-up for any abnormalities.    Any change or worsening of signs or symptoms, patient encouraged to follow-up or report to emergency room for further evaluation. Patient verbalizes understanding and agrees.      PLEASE NOTE: This dictation was created using voice recognition software. I have made every reasonable attempt to correct obvious errors, but I expect that there are errors of grammar and possibly content that I did not discover before finalizing the note.

## 2025-06-23 ENCOUNTER — HOSPITAL ENCOUNTER (OUTPATIENT)
Dept: RADIOLOGY | Facility: MEDICAL CENTER | Age: 45
End: 2025-06-23
Attending: PHYSICAL MEDICINE & REHABILITATION
Payer: COMMERCIAL

## 2025-06-23 DIAGNOSIS — M25.521 RIGHT ELBOW PAIN: ICD-10-CM
